# Patient Record
Sex: FEMALE | Race: WHITE | Employment: OTHER | URBAN - METROPOLITAN AREA
[De-identification: names, ages, dates, MRNs, and addresses within clinical notes are randomized per-mention and may not be internally consistent; named-entity substitution may affect disease eponyms.]

---

## 2018-02-05 ENCOUNTER — HOSPITAL ENCOUNTER (EMERGENCY)
Age: 83
Discharge: HOME | End: 2018-02-05
Attending: EMERGENCY MEDICINE
Payer: MEDICARE

## 2018-02-05 ENCOUNTER — APPOINTMENT (OUTPATIENT)
Dept: GENERAL RADIOLOGY | Age: 83
End: 2018-02-05
Attending: EMERGENCY MEDICINE
Payer: MEDICARE

## 2018-02-05 ENCOUNTER — APPOINTMENT (OUTPATIENT)
Dept: CT IMAGING | Age: 83
End: 2018-02-05
Attending: EMERGENCY MEDICINE
Payer: MEDICARE

## 2018-02-05 VITALS
TEMPERATURE: 97.8 F | HEART RATE: 90 BPM | SYSTOLIC BLOOD PRESSURE: 150 MMHG | DIASTOLIC BLOOD PRESSURE: 67 MMHG | HEIGHT: 62 IN | OXYGEN SATURATION: 96 % | WEIGHT: 180 LBS | RESPIRATION RATE: 14 BRPM | BODY MASS INDEX: 33.13 KG/M2
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
## 2018-02-05 DIAGNOSIS — M25.511 PAIN IN JOINT OF RIGHT SHOULDER: ICD-10-CM

## 2018-02-05 DIAGNOSIS — S09.90XA CLOSED HEAD INJURY, INITIAL ENCOUNTER: ICD-10-CM

## 2018-02-05 PROCEDURE — 72125 CT NECK SPINE W/O DYE: CPT

## 2018-02-05 PROCEDURE — 70450 CT HEAD/BRAIN W/O DYE: CPT

## 2018-02-05 PROCEDURE — 74011250637 HC RX REV CODE- 250/637: Performed by: NURSE PRACTITIONER

## 2018-02-05 PROCEDURE — 73562 X-RAY EXAM OF KNEE 3: CPT

## 2018-02-05 PROCEDURE — 73030 X-RAY EXAM OF SHOULDER: CPT

## 2018-02-05 PROCEDURE — 99283 EMERGENCY DEPT VISIT LOW MDM: CPT | Performed by: NURSE PRACTITIONER

## 2018-02-05 RX ORDER — ACETAMINOPHEN 325 MG/1
650 TABLET ORAL
Status: COMPLETED | OUTPATIENT
Start: 2018-02-05 | End: 2018-02-05

## 2018-02-05 RX ADMIN — ACETAMINOPHEN 650 MG: 325 TABLET ORAL at 17:24

## 2018-02-05 NOTE — ED TRIAGE NOTES
Patient states for awhile now very unsteady especially when she first gets up. Today getting out of a chair and fell to the right side striking the right side of her head, right side of neck, and right knee.

## 2018-02-05 NOTE — ED NOTES
I have reviewed discharge instructions with the patient. The patient verbalized understanding. Patient left ED via Discharge Method: ambulatory to Home with daughter. Opportunity for questions and clarification provided. Patient given 0 scripts. To continue your aftercare when you leave the hospital, you may receive an automated call from our care team to check in on how you are doing. This is a free service and part of our promise to provide the best care and service to meet your aftercare needs.  If you have questions, or wish to unsubscribe from this service please call [phone]. Thank you for Choosing our [facility] Emergency Department.

## 2018-02-05 NOTE — DISCHARGE INSTRUCTIONS

## 2018-02-05 NOTE — ED PROVIDER NOTES
HPI Comments: Patient presents with headache, right shoulder pain, and right knee pain that started today after a fall. She states she was getting out of her chair when she fell. She denies LOC. She states she has been having problems with dizziness with standing. She states she has seen her pcp for problem. She is alert and oriented. She is answering questions appropriately. Patient is a 80 y.o. female presenting with fall. The history is provided by the patient. Fall   The accident occurred 3 to 5 hours ago. The fall occurred while standing. She fell from a height of ground level. She landed on hard floor. There was no blood loss. The point of impact was the head, neck and left shoulder. The pain is present in the right knee, head, neck and right shoulder. The pain is at a severity of 7/10. The pain is mild. She was ambulatory at the scene. There was no entrapment after the fall. There was no drug use involved in the accident. There was no alcohol use involved in the accident. Pertinent negatives include no visual change, no fever, no numbness, no abdominal pain, no bowel incontinence, no nausea, no vomiting, no hematuria, no headaches, no extremity weakness, no hearing loss, no loss of consciousness, no tingling and no laceration. The risk factors include being elderly and recurrent falls. She has tried nothing for the symptoms. Past Medical History:   Diagnosis Date    Diabetes ([address]     Hypertension        Past Surgical History:   Procedure Laterality Date    HX CHOLECYSTECTOMY           History reviewed. No pertinent family history. Social History     Social History    Marital status:      Spouse name: N/A    Number of children: N/A    Years of education: N/A     Occupational History    Not on file.      Social History Main Topics    Smoking status: Never Smoker    Smokeless tobacco: Not on file    Alcohol use No    Drug use: No    Sexual activity: No     Other Topics Concern  Not on file     Social History Narrative         ALLERGIES: Review of patient's allergies indicates no known allergies. Review of Systems   Constitutional: Negative for fever. Respiratory: Negative for cough and shortness of breath. Cardiovascular: Negative for chest pain. Gastrointestinal: Negative for abdominal pain, bowel incontinence, nausea and vomiting. Genitourinary: Negative for hematuria. Musculoskeletal: Positive for arthralgias and neck pain. Negative for extremity weakness and joint swelling. Skin: Positive for wound. Neurological: Negative for tingling, loss of consciousness, numbness and headaches. Vitals:    02/05/18 1441   BP: 150/67   Pulse: 90   Resp: 14   Temp: 97.8 °F (36.6 °C)   SpO2: 96%   Weight: 81.6 kg (180 lb)   Height: 5' 2\" (1.575 m)            Physical Exam   Constitutional: She is oriented to person, place, and time. She appears well-developed and well-nourished. No distress. Cardiovascular: Normal rate and regular rhythm. No murmur heard. Pulmonary/Chest: Effort normal and breath sounds normal.   Abdominal: Soft. Bowel sounds are normal.   Musculoskeletal: Normal range of motion. Right shoulder: She exhibits tenderness and pain. She exhibits normal pulse and normal strength. Right knee: She exhibits bony tenderness. Cervical back: She exhibits no tenderness and no pain. Neurological: She is alert and oriented to person, place, and time. She has normal strength. No cranial nerve deficit or sensory deficit. GCS eye subscore is 4. GCS verbal subscore is 5. GCS motor subscore is 6. Skin: Skin is warm and dry. No laceration noted. She is not diaphoretic. Psychiatric: She has a normal mood and affect. Her behavior is normal.   Nursing note and vitals reviewed.      Xr Shoulder Rt Ap/lat Min 2 V    Result Date: 2/5/2018  THREE-VIEW RIGHT PATELLA, THREE-VIEW RIGHT SHOULDER: CLINICAL HISTORY:  Moderate right shoulder and knee pain after fall. COMPARISON:  None. FINDINGS:  No definite fracture, malalignment, or poli bone destruction is evident. No persistent radiopaque foreign body is seen. There are mild degenerative changes at the knee and acromioclavicular joint. IMPRESSION:  DEGENERATIVE CHANGES BUT NO ACUTE BONY ABNORMALITY IDENTIFIED. Xr Patella Rt 3 V    Result Date: 2/5/2018  THREE-VIEW RIGHT PATELLA, THREE-VIEW RIGHT SHOULDER: CLINICAL HISTORY:  Moderate right shoulder and knee pain after fall. COMPARISON:  None. FINDINGS:  No definite fracture, malalignment, or poli bone destruction is evident. No persistent radiopaque foreign body is seen. There are mild degenerative changes at the knee and acromioclavicular joint. IMPRESSION:  DEGENERATIVE CHANGES BUT NO ACUTE BONY ABNORMALITY IDENTIFIED. Ct Head Wo Cont    Result Date: 2/5/2018  NONCONTRAST HEAD CT CLINICAL HISTORY:  Posterior head injury from fall today. COMPARISON:  None. REPORT:   Standard non contrast head CT demonstrates no definite intracranial mass effect, hemorrhage, or evidence of acute geographic infarction. The ventricles are normal in size and configuration, accounting for the patient's age. Orbits and  paranasal sinuses are clear where imaged. Bone windows demonstrate no definite fracture or destruction. IMPRESSION:     NO ACUTE INTRACRANIAL ABNORMALITY OR CALVARIAL FRACTURE IDENTIFIED. Ct Spine Cerv Wo Cont    Result Date: 2/5/2018  CT CERVICAL SPINE WITH ADDITIONAL REFORMATS CLINICAL HISTORY:  Right neck pain since fall today. TECHNIQUE:  Axial images were obtained with spiral technique, and coronal and sagittal reformats were produced. COMPARISON:  None. FINDINGS:  No definite acute fracture or malalignment is evident. Prevertebral soft tissues are unremarkable. There is degenerative disc disease at C5-6. There is only mild uncovertebral and facet spurring without significant foraminal narrowing.      IMPRESSION:  No acute abnormality. MDM  Number of Diagnoses or Management Options  Closed head injury, initial encounter:   Fall, initial encounter:   Pain in joint of right shoulder:   Diagnosis management comments: No acute abnormality noted on CT of head and neck.  Xray of shoulder and patella negative for acute fracture patient given po tylenol and referred to pcp       Amount and/or Complexity of Data Reviewed  Tests in the radiology section of CPT®: ordered and reviewed  Tests in the medicine section of CPT®: ordered    Patient Progress  Patient progress: stable        ED Course       Procedures

## 2024-12-21 ENCOUNTER — APPOINTMENT (OUTPATIENT)
Dept: GENERAL RADIOLOGY | Age: 88
End: 2024-12-21
Payer: MEDICARE

## 2024-12-21 ENCOUNTER — APPOINTMENT (OUTPATIENT)
Dept: CT IMAGING | Age: 88
End: 2024-12-21
Payer: MEDICARE

## 2024-12-21 ENCOUNTER — HOSPITAL ENCOUNTER (OUTPATIENT)
Age: 88
Setting detail: OBSERVATION
Discharge: HOME | End: 2024-12-23
Attending: EMERGENCY MEDICINE | Admitting: HOSPITALIST
Payer: MEDICARE

## 2024-12-21 DIAGNOSIS — R41.82 ALTERED MENTAL STATUS, UNSPECIFIED ALTERED MENTAL STATUS TYPE: Primary | ICD-10-CM

## 2024-12-21 DIAGNOSIS — T68.XXXA HYPOTHERMIA, INITIAL ENCOUNTER: ICD-10-CM

## 2024-12-21 PROBLEM — E16.2 HYPOGLYCEMIA: Status: ACTIVE | Noted: 2024-12-21

## 2024-12-21 PROBLEM — E03.9 HYPOTHYROIDISM: Status: ACTIVE | Noted: 2024-12-21

## 2024-12-21 PROBLEM — F32.A DEPRESSION: Status: ACTIVE | Noted: 2024-12-21

## 2024-12-21 PROBLEM — G62.9 PERIPHERAL NEUROPATHY: Status: ACTIVE | Noted: 2024-12-21

## 2024-12-21 LAB
ALBUMIN SERPL-MCNC: 3.3 G/DL (ref 3.2–4.6)
ALBUMIN/GLOB SERPL: 1 (ref 1–1.9)
ALP SERPL-CCNC: 69 U/L (ref 35–104)
ALT SERPL-CCNC: 14 U/L (ref 8–45)
ANION GAP SERPL CALC-SCNC: 13 MMOL/L (ref 7–16)
APPEARANCE UR: CLEAR
AST SERPL-CCNC: 38 U/L (ref 15–37)
BACTERIA URNS QL MICRO: 0 /HPF
BASOPHILS # BLD: 0.1 K/UL (ref 0–0.2)
BASOPHILS NFR BLD: 1 % (ref 0–2)
BILIRUB SERPL-MCNC: 0.2 MG/DL (ref 0–1.2)
BILIRUB UR QL: NEGATIVE
BUN SERPL-MCNC: 9 MG/DL (ref 8–23)
CALCIUM SERPL-MCNC: 9.3 MG/DL (ref 8.8–10.2)
CHLORIDE SERPL-SCNC: 95 MMOL/L (ref 98–107)
CO2 SERPL-SCNC: 26 MMOL/L (ref 20–29)
COLOR UR: ABNORMAL
CREAT SERPL-MCNC: 0.75 MG/DL (ref 0.6–1.1)
DIFFERENTIAL METHOD BLD: ABNORMAL
EOSINOPHIL # BLD: 0.3 K/UL (ref 0–0.8)
EOSINOPHIL NFR BLD: 2 % (ref 0.5–7.8)
EPI CELLS #/AREA URNS HPF: ABNORMAL /HPF
ERYTHROCYTE [DISTWIDTH] IN BLOOD BY AUTOMATED COUNT: 13.7 % (ref 11.9–14.6)
GLOBULIN SER CALC-MCNC: 3.4 G/DL (ref 2.3–3.5)
GLUCOSE BLD STRIP.AUTO-MCNC: 175 MG/DL (ref 65–100)
GLUCOSE BLD STRIP.AUTO-MCNC: 183 MG/DL (ref 65–100)
GLUCOSE BLD STRIP.AUTO-MCNC: 193 MG/DL (ref 65–100)
GLUCOSE BLD STRIP.AUTO-MCNC: 86 MG/DL (ref 65–100)
GLUCOSE SERPL-MCNC: 191 MG/DL (ref 70–99)
GLUCOSE UR STRIP.AUTO-MCNC: 100 MG/DL
HCT VFR BLD AUTO: 40.6 % (ref 35.8–46.3)
HGB BLD-MCNC: 13.6 G/DL (ref 11.7–15.4)
HGB UR QL STRIP: ABNORMAL
IMM GRANULOCYTES # BLD AUTO: 0.2 K/UL (ref 0–0.5)
IMM GRANULOCYTES NFR BLD AUTO: 2 % (ref 0–5)
KETONES UR QL STRIP.AUTO: NEGATIVE MG/DL
LACTATE SERPL-SCNC: 1.5 MMOL/L (ref 0.5–2)
LEUKOCYTE ESTERASE UR QL STRIP.AUTO: NEGATIVE
LYMPHOCYTES # BLD: 1.4 K/UL (ref 0.5–4.6)
LYMPHOCYTES NFR BLD: 10 % (ref 13–44)
MCH RBC QN AUTO: 31.4 PG (ref 26.1–32.9)
MCHC RBC AUTO-ENTMCNC: 33.5 G/DL (ref 31.4–35)
MCV RBC AUTO: 93.8 FL (ref 82–102)
MONOCYTES # BLD: 1.3 K/UL (ref 0.1–1.3)
MONOCYTES NFR BLD: 9 % (ref 4–12)
NEUTS SEG # BLD: 10.3 K/UL (ref 1.7–8.2)
NEUTS SEG NFR BLD: 76 % (ref 43–78)
NITRITE UR QL STRIP.AUTO: NEGATIVE
NRBC # BLD: 0 K/UL (ref 0–0.2)
PH UR STRIP: 7 (ref 5–9)
PLATELET # BLD AUTO: 232 K/UL (ref 150–450)
PMV BLD AUTO: 10.4 FL (ref 9.4–12.3)
POTASSIUM SERPL-SCNC: 4.5 MMOL/L (ref 3.5–5.1)
PROCALCITONIN SERPL-MCNC: <0.02 NG/ML (ref 0–0.1)
PROT SERPL-MCNC: 6.7 G/DL (ref 6.3–8.2)
PROT UR STRIP-MCNC: >300 MG/DL
RBC # BLD AUTO: 4.33 M/UL (ref 4.05–5.2)
RBC #/AREA URNS HPF: ABNORMAL /HPF
SERVICE CMNT-IMP: ABNORMAL
SERVICE CMNT-IMP: NORMAL
SODIUM SERPL-SCNC: 134 MMOL/L (ref 136–145)
SP GR UR REFRACTOMETRY: 1.02 (ref 1–1.02)
TROPONIN T SERPL HS-MCNC: 12 NG/L (ref 0–14)
TSH, 3RD GENERATION: 1.87 UIU/ML (ref 0.27–4.2)
UROBILINOGEN UR QL STRIP.AUTO: 0.2 EU/DL (ref 0.2–1)
WBC # BLD AUTO: 13.5 K/UL (ref 4.3–11.1)
WBC URNS QL MICRO: ABNORMAL /HPF

## 2024-12-21 PROCEDURE — 2580000003 HC RX 258: Performed by: EMERGENCY MEDICINE

## 2024-12-21 PROCEDURE — 85025 COMPLETE CBC W/AUTO DIFF WBC: CPT

## 2024-12-21 PROCEDURE — 80053 COMPREHEN METABOLIC PANEL: CPT

## 2024-12-21 PROCEDURE — 84484 ASSAY OF TROPONIN QUANT: CPT

## 2024-12-21 PROCEDURE — 6370000000 HC RX 637 (ALT 250 FOR IP): Performed by: HOSPITALIST

## 2024-12-21 PROCEDURE — 82962 GLUCOSE BLOOD TEST: CPT

## 2024-12-21 PROCEDURE — 99285 EMERGENCY DEPT VISIT HI MDM: CPT

## 2024-12-21 PROCEDURE — 93005 ELECTROCARDIOGRAM TRACING: CPT | Performed by: EMERGENCY MEDICINE

## 2024-12-21 PROCEDURE — 84145 PROCALCITONIN (PCT): CPT

## 2024-12-21 PROCEDURE — 94760 N-INVAS EAR/PLS OXIMETRY 1: CPT

## 2024-12-21 PROCEDURE — 2500000003 HC RX 250 WO HCPCS: Performed by: HOSPITALIST

## 2024-12-21 PROCEDURE — 83605 ASSAY OF LACTIC ACID: CPT

## 2024-12-21 PROCEDURE — 96361 HYDRATE IV INFUSION ADD-ON: CPT

## 2024-12-21 PROCEDURE — 84443 ASSAY THYROID STIM HORMONE: CPT

## 2024-12-21 PROCEDURE — 6360000002 HC RX W HCPCS: Performed by: EMERGENCY MEDICINE

## 2024-12-21 PROCEDURE — 96368 THER/DIAG CONCURRENT INF: CPT

## 2024-12-21 PROCEDURE — 70450 CT HEAD/BRAIN W/O DYE: CPT

## 2024-12-21 PROCEDURE — G0378 HOSPITAL OBSERVATION PER HR: HCPCS

## 2024-12-21 PROCEDURE — 96366 THER/PROPH/DIAG IV INF ADDON: CPT

## 2024-12-21 PROCEDURE — 71045 X-RAY EXAM CHEST 1 VIEW: CPT

## 2024-12-21 PROCEDURE — 96365 THER/PROPH/DIAG IV INF INIT: CPT

## 2024-12-21 PROCEDURE — 2580000003 HC RX 258: Performed by: HOSPITALIST

## 2024-12-21 PROCEDURE — 81001 URINALYSIS AUTO W/SCOPE: CPT

## 2024-12-21 RX ORDER — SODIUM CHLORIDE 9 MG/ML
INJECTION, SOLUTION INTRAVENOUS PRN
Status: DISCONTINUED | OUTPATIENT
Start: 2024-12-21 | End: 2024-12-23 | Stop reason: HOSPADM

## 2024-12-21 RX ORDER — GABAPENTIN 300 MG/1
300 CAPSULE ORAL 2 TIMES DAILY
COMMUNITY

## 2024-12-21 RX ORDER — ASPIRIN 81 MG/1
81 TABLET ORAL DAILY
Status: DISCONTINUED | OUTPATIENT
Start: 2024-12-22 | End: 2024-12-23 | Stop reason: HOSPADM

## 2024-12-21 RX ORDER — LISINOPRIL 5 MG/1
5 TABLET ORAL DAILY
Status: ON HOLD | COMMUNITY
End: 2024-12-23

## 2024-12-21 RX ORDER — POLYETHYLENE GLYCOL 3350 17 G/17G
17 POWDER, FOR SOLUTION ORAL DAILY PRN
Status: DISCONTINUED | OUTPATIENT
Start: 2024-12-21 | End: 2024-12-23 | Stop reason: HOSPADM

## 2024-12-21 RX ORDER — METFORMIN HYDROCHLORIDE 500 MG/1
500 TABLET, EXTENDED RELEASE ORAL 2 TIMES DAILY
COMMUNITY

## 2024-12-21 RX ORDER — ACETAMINOPHEN 325 MG/1
650 TABLET ORAL EVERY 6 HOURS PRN
Status: DISCONTINUED | OUTPATIENT
Start: 2024-12-21 | End: 2024-12-23 | Stop reason: HOSPADM

## 2024-12-21 RX ORDER — SODIUM CHLORIDE 0.9 % (FLUSH) 0.9 %
5-40 SYRINGE (ML) INJECTION PRN
Status: DISCONTINUED | OUTPATIENT
Start: 2024-12-21 | End: 2024-12-23 | Stop reason: HOSPADM

## 2024-12-21 RX ORDER — LATANOPROST 50 UG/ML
1 SOLUTION/ DROPS OPHTHALMIC NIGHTLY
Status: DISCONTINUED | OUTPATIENT
Start: 2024-12-21 | End: 2024-12-23 | Stop reason: HOSPADM

## 2024-12-21 RX ORDER — POTASSIUM CHLORIDE 7.45 MG/ML
10 INJECTION INTRAVENOUS PRN
Status: DISCONTINUED | OUTPATIENT
Start: 2024-12-21 | End: 2024-12-23 | Stop reason: HOSPADM

## 2024-12-21 RX ORDER — TRIAMCINOLONE ACETONIDE 1 MG/G
0.1 CREAM TOPICAL 2 TIMES DAILY
COMMUNITY

## 2024-12-21 RX ORDER — DEXTROSE MONOHYDRATE 25 G/50ML
25 INJECTION, SOLUTION INTRAVENOUS
Status: DISCONTINUED | OUTPATIENT
Start: 2024-12-21 | End: 2024-12-21

## 2024-12-21 RX ORDER — ESCITALOPRAM OXALATE 10 MG/1
10 TABLET ORAL DAILY
COMMUNITY

## 2024-12-21 RX ORDER — LEVOTHYROXINE SODIUM 112 UG/1
112 TABLET ORAL DAILY
COMMUNITY
Start: 2024-04-11

## 2024-12-21 RX ORDER — CYANOCOBALAMIN (VITAMIN B-12) 5000 MCG
5000 TABLET,DISINTEGRATING ORAL DAILY
COMMUNITY

## 2024-12-21 RX ORDER — ENOXAPARIN SODIUM 100 MG/ML
40 INJECTION SUBCUTANEOUS DAILY
Status: DISCONTINUED | OUTPATIENT
Start: 2024-12-21 | End: 2024-12-23 | Stop reason: HOSPADM

## 2024-12-21 RX ORDER — ACETAMINOPHEN 325 MG/1
325 TABLET ORAL EVERY 6 HOURS PRN
COMMUNITY

## 2024-12-21 RX ORDER — ONDANSETRON 4 MG/1
4 TABLET, ORALLY DISINTEGRATING ORAL EVERY 8 HOURS PRN
Status: DISCONTINUED | OUTPATIENT
Start: 2024-12-21 | End: 2024-12-23 | Stop reason: HOSPADM

## 2024-12-21 RX ORDER — TIMOLOL MALEATE 5 MG/ML
1 SOLUTION/ DROPS OPHTHALMIC 2 TIMES DAILY
COMMUNITY

## 2024-12-21 RX ORDER — POTASSIUM CHLORIDE 1500 MG/1
40 TABLET, EXTENDED RELEASE ORAL PRN
Status: DISCONTINUED | OUTPATIENT
Start: 2024-12-21 | End: 2024-12-23 | Stop reason: HOSPADM

## 2024-12-21 RX ORDER — INSULIN LISPRO 100 [IU]/ML
INJECTION, SOLUTION INTRAVENOUS; SUBCUTANEOUS
Status: ON HOLD | COMMUNITY
End: 2024-12-23 | Stop reason: HOSPADM

## 2024-12-21 RX ORDER — NAPROXEN SODIUM 220 MG/1
220 TABLET, FILM COATED ORAL DAILY PRN
COMMUNITY

## 2024-12-21 RX ORDER — LOPERAMIDE HYDROCHLORIDE 2 MG/1
2 CAPSULE ORAL DAILY PRN
COMMUNITY

## 2024-12-21 RX ORDER — LEVOTHYROXINE SODIUM 112 UG/1
112 TABLET ORAL DAILY
Status: DISCONTINUED | OUTPATIENT
Start: 2024-12-22 | End: 2024-12-23 | Stop reason: HOSPADM

## 2024-12-21 RX ORDER — SODIUM CHLORIDE 9 MG/ML
INJECTION, SOLUTION INTRAVENOUS CONTINUOUS
Status: DISCONTINUED | OUTPATIENT
Start: 2024-12-21 | End: 2024-12-22

## 2024-12-21 RX ORDER — ASPIRIN 81 MG/1
81 TABLET ORAL DAILY
COMMUNITY

## 2024-12-21 RX ORDER — BRINZOLAMIDE 10 MG/ML
1 SUSPENSION/ DROPS OPHTHALMIC 2 TIMES DAILY
COMMUNITY

## 2024-12-21 RX ORDER — 0.9 % SODIUM CHLORIDE 0.9 %
500 INTRAVENOUS SOLUTION INTRAVENOUS ONCE
Status: COMPLETED | OUTPATIENT
Start: 2024-12-21 | End: 2024-12-21

## 2024-12-21 RX ORDER — LATANOPROST 50 UG/ML
1 SOLUTION/ DROPS OPHTHALMIC NIGHTLY
COMMUNITY
Start: 2024-11-09

## 2024-12-21 RX ORDER — INSULIN GLARGINE 100 [IU]/ML
50 INJECTION, SOLUTION SUBCUTANEOUS DAILY
Status: ON HOLD | COMMUNITY
Start: 2024-10-31 | End: 2024-12-23

## 2024-12-21 RX ORDER — MAGNESIUM SULFATE IN WATER 40 MG/ML
2000 INJECTION, SOLUTION INTRAVENOUS PRN
Status: DISCONTINUED | OUTPATIENT
Start: 2024-12-21 | End: 2024-12-23 | Stop reason: HOSPADM

## 2024-12-21 RX ORDER — ONDANSETRON 2 MG/ML
4 INJECTION INTRAMUSCULAR; INTRAVENOUS EVERY 6 HOURS PRN
Status: DISCONTINUED | OUTPATIENT
Start: 2024-12-21 | End: 2024-12-23 | Stop reason: HOSPADM

## 2024-12-21 RX ORDER — ONDANSETRON 4 MG/1
4 TABLET, ORALLY DISINTEGRATING ORAL 3 TIMES DAILY PRN
COMMUNITY

## 2024-12-21 RX ORDER — ALLOPURINOL 100 MG/1
100 TABLET ORAL DAILY
COMMUNITY
Start: 2024-10-31

## 2024-12-21 RX ORDER — BRIMONIDINE TARTRATE AND TIMOLOL MALEATE 2; 5 MG/ML; MG/ML
1 SOLUTION OPHTHALMIC EVERY 12 HOURS
COMMUNITY

## 2024-12-21 RX ORDER — ACETAMINOPHEN 650 MG/1
650 SUPPOSITORY RECTAL EVERY 6 HOURS PRN
Status: DISCONTINUED | OUTPATIENT
Start: 2024-12-21 | End: 2024-12-23 | Stop reason: HOSPADM

## 2024-12-21 RX ORDER — SODIUM CHLORIDE 0.9 % (FLUSH) 0.9 %
5-40 SYRINGE (ML) INJECTION EVERY 12 HOURS SCHEDULED
Status: DISCONTINUED | OUTPATIENT
Start: 2024-12-21 | End: 2024-12-23 | Stop reason: HOSPADM

## 2024-12-21 RX ORDER — LISINOPRIL 5 MG/1
5 TABLET ORAL DAILY
Status: DISCONTINUED | OUTPATIENT
Start: 2024-12-22 | End: 2024-12-23

## 2024-12-21 RX ORDER — ALLOPURINOL 100 MG/1
100 TABLET ORAL DAILY
Status: DISCONTINUED | OUTPATIENT
Start: 2024-12-22 | End: 2024-12-23 | Stop reason: HOSPADM

## 2024-12-21 RX ORDER — INSULIN ASPART 100 [IU]/ML
12 INJECTION, SOLUTION INTRAVENOUS; SUBCUTANEOUS
COMMUNITY

## 2024-12-21 RX ADMIN — SODIUM CHLORIDE, PRESERVATIVE FREE 10 ML: 5 INJECTION INTRAVENOUS at 20:48

## 2024-12-21 RX ADMIN — SODIUM CHLORIDE: 9 INJECTION, SOLUTION INTRAVENOUS at 20:47

## 2024-12-21 RX ADMIN — CEFTRIAXONE SODIUM 2000 MG: 2 INJECTION, POWDER, FOR SOLUTION INTRAMUSCULAR; INTRAVENOUS at 17:52

## 2024-12-21 RX ADMIN — DEXTROSE MONOHYDRATE 250 ML: 100 INJECTION, SOLUTION INTRAVENOUS at 17:08

## 2024-12-21 RX ADMIN — LATANOPROST 1 DROP: 50 SOLUTION OPHTHALMIC at 22:09

## 2024-12-21 RX ADMIN — SODIUM CHLORIDE 500 ML: 9 INJECTION, SOLUTION INTRAVENOUS at 16:01

## 2024-12-21 ASSESSMENT — LIFESTYLE VARIABLES
HOW OFTEN DO YOU HAVE A DRINK CONTAINING ALCOHOL: NEVER
HOW MANY STANDARD DRINKS CONTAINING ALCOHOL DO YOU HAVE ON A TYPICAL DAY: PATIENT DOES NOT DRINK

## 2024-12-21 NOTE — ED PROVIDER NOTES
Electronically signed by Mariano Wang         NIH Stroke Scale  Interval: Baseline  Level of Consciousness (1a): Not alert, requires repeated stimulation to attend  LOC Questions (1b): Answers both correctly  LOC Commands (1c): Performs both tasks correctly  Best Gaze (2): Normal  Visual (3): No visual loss  Facial Palsy (4): (!) Minor paralysis  Motor Arm, Left (5a): Drift, but does not hit bed  Motor Arm, Right (5b): Drift, but does not hit bed  Motor Leg, Left (6a): Some effort against gravity  Motor Leg, Right (6b): Some effort against gravity  Sensory (8): Normal  Best Language (9): No aphasia  Dysarthria (10): Normal  Extinction and Inattention (11): No abnormality         No results for input(s): \"COVID19\" in the last 72 hours.     Voice dictation software was used during the making of this note.  This software is not perfect and grammatical and other typographical errors may be present.  This note has not been completely proofread for errors.     Davie Hinojosa MD  12/21/24 8542

## 2024-12-21 NOTE — ED TRIAGE NOTES
Pt from home via EMS for hypoglycemia, BS was 40 and pt was cold, clammy and unresponsive, last meal was breakfast this morning. 250 D10 Given en route and last BS was 200 in EMS

## 2024-12-21 NOTE — ED NOTES
Pt now alert and oriented, taking on phone eating crackers, daughter at bedside, pt / family updated

## 2024-12-22 PROBLEM — E11.649 TYPE 2 DIABETES MELLITUS WITH HYPOGLYCEMIA WITHOUT COMA (HCC): Status: ACTIVE | Noted: 2024-12-22

## 2024-12-22 PROBLEM — H40.10X0 OPEN-ANGLE GLAUCOMA: Status: ACTIVE | Noted: 2024-12-22

## 2024-12-22 PROBLEM — H35.30 MACULAR DEGENERATION: Status: ACTIVE | Noted: 2024-12-22

## 2024-12-22 LAB
ALBUMIN SERPL-MCNC: 3.1 G/DL (ref 3.2–4.6)
ALBUMIN/GLOB SERPL: 1 (ref 1–1.9)
ALP SERPL-CCNC: 61 U/L (ref 35–104)
ALT SERPL-CCNC: 13 U/L (ref 8–45)
ANION GAP SERPL CALC-SCNC: 11 MMOL/L (ref 7–16)
AST SERPL-CCNC: 29 U/L (ref 15–37)
BASOPHILS # BLD: 0.1 K/UL (ref 0–0.2)
BASOPHILS NFR BLD: 1 % (ref 0–2)
BILIRUB SERPL-MCNC: <0.2 MG/DL (ref 0–1.2)
BUN SERPL-MCNC: 8 MG/DL (ref 8–23)
CALCIUM SERPL-MCNC: 8.8 MG/DL (ref 8.8–10.2)
CHLORIDE SERPL-SCNC: 103 MMOL/L (ref 98–107)
CO2 SERPL-SCNC: 24 MMOL/L (ref 20–29)
CREAT SERPL-MCNC: 0.58 MG/DL (ref 0.6–1.1)
DIFFERENTIAL METHOD BLD: ABNORMAL
EKG ATRIAL RATE: 54 BPM
EKG DIAGNOSIS: NORMAL
EKG P AXIS: 75 DEGREES
EKG P-R INTERVAL: 263 MS
EKG Q-T INTERVAL: 467 MS
EKG QRS DURATION: 112 MS
EKG QTC CALCULATION (BAZETT): 435 MS
EKG R AXIS: -84 DEGREES
EKG T AXIS: 52 DEGREES
EKG VENTRICULAR RATE: 54 BPM
EOSINOPHIL # BLD: 0.2 K/UL (ref 0–0.8)
EOSINOPHIL NFR BLD: 2 % (ref 0.5–7.8)
ERYTHROCYTE [DISTWIDTH] IN BLOOD BY AUTOMATED COUNT: 13.6 % (ref 11.9–14.6)
GLOBULIN SER CALC-MCNC: 3 G/DL (ref 2.3–3.5)
GLUCOSE BLD STRIP.AUTO-MCNC: 125 MG/DL (ref 65–100)
GLUCOSE BLD STRIP.AUTO-MCNC: 240 MG/DL (ref 65–100)
GLUCOSE BLD STRIP.AUTO-MCNC: 290 MG/DL (ref 65–100)
GLUCOSE BLD STRIP.AUTO-MCNC: 292 MG/DL (ref 65–100)
GLUCOSE BLD STRIP.AUTO-MCNC: 294 MG/DL (ref 65–100)
GLUCOSE SERPL-MCNC: 136 MG/DL (ref 70–99)
HCT VFR BLD AUTO: 37.6 % (ref 35.8–46.3)
HGB BLD-MCNC: 12.2 G/DL (ref 11.7–15.4)
IMM GRANULOCYTES # BLD AUTO: 0.1 K/UL (ref 0–0.5)
IMM GRANULOCYTES NFR BLD AUTO: 1 % (ref 0–5)
LYMPHOCYTES # BLD: 1.7 K/UL (ref 0.5–4.6)
LYMPHOCYTES NFR BLD: 16 % (ref 13–44)
MCH RBC QN AUTO: 30.4 PG (ref 26.1–32.9)
MCHC RBC AUTO-ENTMCNC: 32.4 G/DL (ref 31.4–35)
MCV RBC AUTO: 93.8 FL (ref 82–102)
MONOCYTES # BLD: 1.2 K/UL (ref 0.1–1.3)
MONOCYTES NFR BLD: 11 % (ref 4–12)
NEUTS SEG # BLD: 7 K/UL (ref 1.7–8.2)
NEUTS SEG NFR BLD: 68 % (ref 43–78)
NRBC # BLD: 0 K/UL (ref 0–0.2)
PLATELET # BLD AUTO: 184 K/UL (ref 150–450)
PMV BLD AUTO: 10.3 FL (ref 9.4–12.3)
POTASSIUM SERPL-SCNC: 4.6 MMOL/L (ref 3.5–5.1)
PROT SERPL-MCNC: 6.1 G/DL (ref 6.3–8.2)
RBC # BLD AUTO: 4.01 M/UL (ref 4.05–5.2)
SERVICE CMNT-IMP: ABNORMAL
SODIUM SERPL-SCNC: 138 MMOL/L (ref 136–145)
WBC # BLD AUTO: 10.3 K/UL (ref 4.3–11.1)

## 2024-12-22 PROCEDURE — G0378 HOSPITAL OBSERVATION PER HR: HCPCS

## 2024-12-22 PROCEDURE — 97165 OT EVAL LOW COMPLEX 30 MIN: CPT

## 2024-12-22 PROCEDURE — 97530 THERAPEUTIC ACTIVITIES: CPT

## 2024-12-22 PROCEDURE — 6370000000 HC RX 637 (ALT 250 FOR IP): Performed by: PHYSICIAN ASSISTANT

## 2024-12-22 PROCEDURE — 96361 HYDRATE IV INFUSION ADD-ON: CPT

## 2024-12-22 PROCEDURE — 80053 COMPREHEN METABOLIC PANEL: CPT

## 2024-12-22 PROCEDURE — 97535 SELF CARE MNGMENT TRAINING: CPT

## 2024-12-22 PROCEDURE — 36415 COLL VENOUS BLD VENIPUNCTURE: CPT

## 2024-12-22 PROCEDURE — 82962 GLUCOSE BLOOD TEST: CPT

## 2024-12-22 PROCEDURE — 93010 ELECTROCARDIOGRAM REPORT: CPT | Performed by: INTERNAL MEDICINE

## 2024-12-22 PROCEDURE — 85025 COMPLETE CBC W/AUTO DIFF WBC: CPT

## 2024-12-22 PROCEDURE — 6370000000 HC RX 637 (ALT 250 FOR IP): Performed by: HOSPITALIST

## 2024-12-22 PROCEDURE — 97161 PT EVAL LOW COMPLEX 20 MIN: CPT

## 2024-12-22 RX ORDER — INSULIN LISPRO 100 [IU]/ML
0-8 INJECTION, SOLUTION INTRAVENOUS; SUBCUTANEOUS
Status: DISCONTINUED | OUTPATIENT
Start: 2024-12-22 | End: 2024-12-23 | Stop reason: HOSPADM

## 2024-12-22 RX ORDER — INSULIN GLARGINE 100 [IU]/ML
48 INJECTION, SOLUTION SUBCUTANEOUS NIGHTLY
Status: DISCONTINUED | OUTPATIENT
Start: 2024-12-22 | End: 2024-12-23 | Stop reason: HOSPADM

## 2024-12-22 RX ORDER — INSULIN LISPRO 100 [IU]/ML
18 INJECTION, SOLUTION INTRAVENOUS; SUBCUTANEOUS
Status: DISCONTINUED | OUTPATIENT
Start: 2024-12-22 | End: 2024-12-23

## 2024-12-22 RX ADMIN — INSULIN LISPRO 4 UNITS: 100 INJECTION, SOLUTION INTRAVENOUS; SUBCUTANEOUS at 16:58

## 2024-12-22 RX ADMIN — LATANOPROST 1 DROP: 50 SOLUTION OPHTHALMIC at 20:04

## 2024-12-22 RX ADMIN — LEVOTHYROXINE SODIUM 112 MCG: 0.11 TABLET ORAL at 05:34

## 2024-12-22 RX ADMIN — INSULIN LISPRO 18 UNITS: 100 INJECTION, SOLUTION INTRAVENOUS; SUBCUTANEOUS at 17:00

## 2024-12-22 RX ADMIN — INSULIN LISPRO 2 UNITS: 100 INJECTION, SOLUTION INTRAVENOUS; SUBCUTANEOUS at 12:09

## 2024-12-22 RX ADMIN — INSULIN LISPRO 4 UNITS: 100 INJECTION, SOLUTION INTRAVENOUS; SUBCUTANEOUS at 20:04

## 2024-12-22 RX ADMIN — INSULIN GLARGINE 48 UNITS: 100 INJECTION, SOLUTION SUBCUTANEOUS at 20:04

## 2024-12-22 RX ADMIN — ALLOPURINOL 100 MG: 100 TABLET ORAL at 08:55

## 2024-12-22 NOTE — ACP (ADVANCE CARE PLANNING)
Advance Care Planning   General Advance Care Planning (ACP) Conversation    Date of Conversation: 12/21/2024  Conducted with: Patient with Decision Making Capacity  Other persons present: None    Healthcare Decision Maker:    Primary Decision Maker: Ranjana Fox - Lidia - [phone]    Primary Decision Maker: Karlie Montero - [phone]          Annie Vásquez

## 2024-12-22 NOTE — CARE COORDINATION
CM note:    Chart reviewed for updates. CM met with pt at bedside, introduced role, confirmed demographics and discussed d/c planning. Pt lives with her daughter in a 1 level home with steps to get into the home. Pt is independent with ADL's at home and uses a RW or rollator to ambulate. She is insured and has a PCP that she sees as needed. Pt is no longer an active  in the community. She reports that she has been to inpatient rehab in the past and has used home health services. Therapy reports no needs for patient. Plan is for pt to discharge home with family support pending medical stability. Pt is on OBS. MOON given. CM will continue to follow up with d/c planning.     12/22/24 9606   Service Assessment   Patient Orientation Alert and Oriented   Cognition Alert   History Provided By Patient   Primary Caregiver Self   Accompanied By/Relationship N/A   Support Systems Family Members;Children   Patient's Healthcare Decision Maker is: Legal Next of Kin  (Daughter)   PCP Verified by CM Yes   Last Visit to PCP Within last 6 months   Prior Functional Level Independent in ADLs/IADLs   Current Functional Level Independent in ADLs/IADLs   Can patient return to prior living arrangement Yes   Ability to make needs known: Good   Family able to assist with home care needs: Yes   Would you like for me to discuss the discharge plan with any other family members/significant others, and if so, who? Yes  (Daughter)   Financial Resources Medicare   Community Resources None   Social/Functional History   Lives With Family   Type of Home House   Bathroom Shower/Tub Shower chair with back   Home Equipment Walker - Rolling;Rollator   Prior Level of Assist for ADLs Independent   Ambulation Assistance Independent   Active  No   Occupation Retired   Discharge Planning   Type of Residence House   Living Arrangements Children   Current Services Prior To Admission Durable Medical Equipment   Current DME Prior to Arrival Walker

## 2024-12-22 NOTE — ED NOTES
TRANSFER - OUT REPORT:    Verbal report given to Kristy on Melissa Molina  being transferred to [facility] for routine progression of patient care       Report consisted of patient's Situation, Background, Assessment and   Recommendations(SBAR).     Information from the following report(s) Nurse Handoff Report was reviewed with the receiving nurse.    Lines:   Peripheral IV 12/21/24 Left Antecubital (Active)        Opportunity for questions and clarification was provided.      Patient transported with:  Registered Nurse

## 2024-12-22 NOTE — H&P
5.1 mmol/L    Chloride 95 (L) 98 - 107 mmol/L    CO2 26 20 - 29 mmol/L    Anion Gap 13 7 - 16 mmol/L    Glucose 191 (H) 70 - 99 mg/dL    BUN 9 8 - 23 MG/DL    Creatinine 0.75 0.60 - 1.10 MG/DL    Est, Glom Filt Rate 75 >60 ml/min/1.73m2    Calcium 9.3 8.8 - 10.2 MG/DL    Total Bilirubin 0.2 0.0 - 1.2 MG/DL    ALT 14 8 - 45 U/L    AST 38 (H) 15 - 37 U/L    Alk Phosphatase 69 35 - 104 U/L    Total Protein 6.7 6.3 - 8.2 g/dL    Albumin 3.3 3.2 - 4.6 g/dL    Globulin 3.4 2.3 - 3.5 g/dL    Albumin/Globulin Ratio 1.0 1.0 - 1.9     TSH    Collection Time: 12/21/24  3:09 PM   Result Value Ref Range    TSH, 3rd Generation 1.870 0.270 - 4.200 uIU/mL   Urinalysis    Collection Time: 12/21/24  3:09 PM   Result Value Ref Range    Color, UA YELLOW/STRAW      Appearance CLEAR      Specific Gravity, UA 1.018 1.001 - 1.023      pH, Urine 7.0 5.0 - 9.0      Protein, UA >300 (A) NEG mg/dL    Glucose, Ur 100 (A) NEG mg/dL    Ketones, Urine Negative NEG mg/dL    Bilirubin, Urine Negative NEG      Blood, Urine TRACE (A) NEG      Urobilinogen, Urine 0.2 0.2 - 1.0 EU/dL    Nitrite, Urine Negative NEG      Leukocyte Esterase, Urine Negative NEG      WBC, UA 5-10 0 /hpf    RBC, UA 0-3 0 /hpf    Epithelial Cells, UA 3-5 0 /hpf    BACTERIA, URINE 0 0 /hpf   Troponin    Collection Time: 12/21/24  3:09 PM   Result Value Ref Range    Troponin T 12.0 0 - 14 ng/L   Lactate, Sepsis    Collection Time: 12/21/24  3:09 PM   Result Value Ref Range    Lactic Acid, Sepsis 1.5 0.5 - 2.0 MMOL/L   POCT Glucose    Collection Time: 12/21/24  4:56 PM   Result Value Ref Range    POC Glucose 86 65 - 100 mg/dL    Performed by: Korey    POCT Glucose    Collection Time: 12/21/24  6:01 PM   Result Value Ref Range    POC Glucose 175 (H) 65 - 100 mg/dL    Performed by: Dinah    POCT Glucose    Collection Time: 12/21/24  8:34 PM   Result Value Ref Range    POC Glucose 183 (H) 65 - 100 mg/dL    Performed by: Amilcar        No results for

## 2024-12-23 VITALS
HEIGHT: 62 IN | RESPIRATION RATE: 19 BRPM | OXYGEN SATURATION: 95 % | WEIGHT: 197 LBS | BODY MASS INDEX: 36.25 KG/M2 | TEMPERATURE: 98.2 F | SYSTOLIC BLOOD PRESSURE: 151 MMHG | DIASTOLIC BLOOD PRESSURE: 52 MMHG | HEART RATE: 87 BPM

## 2024-12-23 PROBLEM — E16.2 HYPOGLYCEMIA: Status: RESOLVED | Noted: 2024-12-21 | Resolved: 2024-12-23

## 2024-12-23 LAB
GLUCOSE BLD STRIP.AUTO-MCNC: 154 MG/DL (ref 65–100)
GLUCOSE BLD STRIP.AUTO-MCNC: 301 MG/DL (ref 65–100)
SERVICE CMNT-IMP: ABNORMAL
SERVICE CMNT-IMP: ABNORMAL

## 2024-12-23 PROCEDURE — G0378 HOSPITAL OBSERVATION PER HR: HCPCS

## 2024-12-23 PROCEDURE — 6370000000 HC RX 637 (ALT 250 FOR IP): Performed by: PHYSICIAN ASSISTANT

## 2024-12-23 PROCEDURE — 6370000000 HC RX 637 (ALT 250 FOR IP): Performed by: HOSPITALIST

## 2024-12-23 PROCEDURE — 2500000003 HC RX 250 WO HCPCS: Performed by: HOSPITALIST

## 2024-12-23 PROCEDURE — 6360000002 HC RX W HCPCS: Performed by: HOSPITALIST

## 2024-12-23 PROCEDURE — 82962 GLUCOSE BLOOD TEST: CPT

## 2024-12-23 PROCEDURE — 96372 THER/PROPH/DIAG INJ SC/IM: CPT

## 2024-12-23 RX ORDER — LISINOPRIL 5 MG/1
10 TABLET ORAL DAILY
Status: DISCONTINUED | OUTPATIENT
Start: 2024-12-23 | End: 2024-12-23 | Stop reason: HOSPADM

## 2024-12-23 RX ORDER — IBUPROFEN 600 MG/1
1 TABLET ORAL PRN
Status: DISCONTINUED | OUTPATIENT
Start: 2024-12-23 | End: 2024-12-23 | Stop reason: HOSPADM

## 2024-12-23 RX ORDER — GABAPENTIN 300 MG/1
300 CAPSULE ORAL ONCE
Status: COMPLETED | OUTPATIENT
Start: 2024-12-23 | End: 2024-12-23

## 2024-12-23 RX ORDER — LISINOPRIL 5 MG/1
5 TABLET ORAL DAILY
Qty: 30 TABLET | Refills: 3 | Status: SHIPPED | OUTPATIENT
Start: 2024-12-23 | End: 2024-12-23

## 2024-12-23 RX ORDER — LISINOPRIL 5 MG/1
5 TABLET ORAL DAILY
Qty: 30 TABLET | Refills: 3 | Status: SHIPPED | OUTPATIENT
Start: 2024-12-23

## 2024-12-23 RX ORDER — DEXTROSE MONOHYDRATE 100 MG/ML
INJECTION, SOLUTION INTRAVENOUS CONTINUOUS PRN
Status: DISCONTINUED | OUTPATIENT
Start: 2024-12-23 | End: 2024-12-23 | Stop reason: HOSPADM

## 2024-12-23 RX ORDER — INSULIN GLARGINE 100 [IU]/ML
48 INJECTION, SOLUTION SUBCUTANEOUS DAILY
Qty: 5 ADJUSTABLE DOSE PRE-FILLED PEN SYRINGE | Refills: 3 | Status: SHIPPED | OUTPATIENT
Start: 2024-12-23

## 2024-12-23 RX ADMIN — SODIUM CHLORIDE, PRESERVATIVE FREE 10 ML: 5 INJECTION INTRAVENOUS at 09:07

## 2024-12-23 RX ADMIN — ASPIRIN 81 MG: 81 TABLET, COATED ORAL at 09:06

## 2024-12-23 RX ADMIN — GABAPENTIN 300 MG: 300 CAPSULE ORAL at 12:09

## 2024-12-23 RX ADMIN — ENOXAPARIN SODIUM 40 MG: 100 INJECTION SUBCUTANEOUS at 09:06

## 2024-12-23 RX ADMIN — INSULIN LISPRO 6 UNITS: 100 INJECTION, SOLUTION INTRAVENOUS; SUBCUTANEOUS at 12:09

## 2024-12-23 RX ADMIN — ALLOPURINOL 100 MG: 100 TABLET ORAL at 09:06

## 2024-12-23 RX ADMIN — LISINOPRIL 10 MG: 5 TABLET ORAL at 09:06

## 2024-12-23 RX ADMIN — LEVOTHYROXINE SODIUM 112 MCG: 0.11 TABLET ORAL at 05:19

## 2024-12-23 NOTE — CARE COORDINATION
Patient with discharge orders for today. No additional needs made known to CM. Patient has met all treatment goals and milestones for discharge. Family to provide transportation home. CM following until patient is discharged.        12/23/24 1106   Services At/After Discharge   Transition of Care Consult (CM Consult) N/A   Services At/After Discharge None    Resource Information Provided? No   Mode of Transport at Discharge Other (see comment)  (Family)   Confirm Follow Up Transport Family   Condition of Participation: Discharge Planning   The Plan for Transition of Care is related to the following treatment goals: Patient to DC home and return to baseline level of function.   The Patient and/or Patient Representative was provided with a Choice of Provider? Patient   The Patient and/Or Patient Representative agree with the Discharge Plan? Yes   Freedom of Choice list was provided with basic dialogue that supports the patient's individualized plan of care/goals, treatment preferences, and shares the quality data associated with the providers?  Yes

## 2024-12-23 NOTE — PROGRESS NOTES
Hospitalist Discharge Summary   Admit Date:  2024  2:44 PM   DC Note date: 2024  Name:  Melissa Molina   Age:  89 y.o.  Sex:  female  :  1933   MRN:  [id number]   Room:  [facility]  PCP:  Skylar Polo MD    Presenting Complaint: Hypoglycemia and Altered Mental Status     Initial Admission Diagnosis: Hypoglycemia [E16.2]  Hypothermia, initial encounter [T68.XXXA]  Altered mental status, unspecified altered mental status type [R41.82]     Problem List for this Hospitalization (present on admission):    Principal Problem (Resolved):    Hypoglycemia  Active Problems:    Hypothyroidism    Depression    Peripheral neuropathy    Type 2 diabetes mellitus with hypoglycemia without coma (HCC)    Open-angle glaucoma    Macular degeneration      Hospital Course:  Melissa Molina is a 89 y.o. female with past medical history of diabetes type 2 on Premeal insulin 3 times a day and long-acting units around 50 units in the evening, history of for depression and peripheral neuropathy, hypothyroidism brought to emergency room with severe hypoglycemia, hypothermia. Patient currently lives with daughter, patient's daughter noted altered mental status around 2 PM, minimally responsive. Blood sugars was low, EMS was called in, patient was given D10 bolus to 50 upon arrival to emergency room blood sugar was greater than 100.     The patient's blood glucose has recovered.  She has been restarted on her home insulin.  This morning her blood glucose was 153 so her 18 units of short acting were held.  She will be discharged on her home Lantus but not on her short acting medications.  She is agreeable to follow-up with her PCP after discharge for further modification.  I reiterated with her that with her on insulin it is very important that she eats a regular 3 meals a day.  She understands and feels safe going home with her daughter.    Her blood pressure also has been significantly elevated since admission.  She 
ACUTE OCCUPATIONAL THERAPY GOALS:   (Developed with and agreed upon by patient and/or caregiver.)  Pt will be modified (I) with ADL's and ambulating short distances.     OCCUPATIONAL THERAPY Initial Assessment, Daily Note, Discharge, and PM       OT Visit Days: 1  Acknowledge Orders  Time  OT Charge Capture  Rehab Caseload Tracker      Melissa Molina is a 89 y.o. female   PRIMARY DIAGNOSIS: Hypoglycemia  Hypoglycemia [E16.2]  Hypothermia, initial encounter [T68.XXXA]  Altered mental status, unspecified altered mental status type [R41.82]       Reason for Referral: Generalized Muscle Weakness (M62.81)  Observation: Payor: MEDICARE / Plan: MEDICARE PART A AND B / Product Type: *No Product type* /     ASSESSMENT:     REHAB RECOMMENDATIONS:   Recommendation to date pending progress:  Setting:  No further skilled occupational therapy after discharge from hospital    Equipment:    None     ASSESSMENT:  Ms. Molina was admitted with above diagnosis. Pt seen in room and noted to alert, oriented and willing to work with therapy. Pt is anxious to get home as her  [facility] and the family is going to see him on Tuesday. Pt is noted up household distance and able to attend to her own self needs with a RW. Pt lives with family and despite the fact they work she is able to attend to herself when they are not available. No further OT needs.      [facility] AM-PAC™ “6 Clicks” Daily Activity Inpatient Short Form:                SUBJECTIVE:     Ms. Molina states, \"I need to get home\"     Social/Functional Lives With: Family  Type of Home: House  Bathroom Shower/Tub: Shower chair with back  Home Equipment: Walker - Rolling, Rollator    OBJECTIVE:     LINES / DRAINS / AIRWAY: None    RESTRICTIONS/PRECAUTIONS:       PAIN: VITALS / O2:   Pre Treatment:          Post Treatment: none       Vitals          Oxygen            GROSS EVALUATION: INTACT IMPAIRED   (See Comments)   UE AROM [x] []   UE PROM [x] [] 
ACUTE PHYSICAL THERAPY GOALS:   (Developed with and agreed upon by patient and/or caregiver.)      LTG:  (1.)Ms. Molina will move from supine to sit and sit to supine  in bed with INDEPENDENT within 5 treatment day(s).    (2.)Ms. Molina will transfer from bed to chair and chair to bed with MODIFIED INDEPENDENCE using the least restrictive device within 5 treatment day(s).    (3.)Ms. Molina will ambulate with MODIFIED INDEPENDENCE for 200 feet with the least restrictive device within 5 treatment day(s).  ________________________________________________________________________________________________      PHYSICAL THERAPY Initial Assessment  (Link to Caseload Tracking: PT Visit Days : 1  Acknowledge Orders  Time In/Out  PT Charge Capture  Rehab Caseload Tracker    Melissa Molina is a 89 y.o. female   PRIMARY DIAGNOSIS: Hypoglycemia  Hypoglycemia [E16.2]  Hypothermia, initial encounter [T68.XXXA]  Altered mental status, unspecified altered mental status type [R41.82]       Reason for Referral: Generalized Muscle Weakness (M62.81)  Observation: Payor: MEDICARE / Plan: MEDICARE PART A AND B / Product Type: *No Product type* /     ASSESSMENT:     REHAB RECOMMENDATIONS:   Recommendation to date pending progress:  Setting:  No further skilled physical therapy after discharge from hospital    Equipment:    None     ASSESSMENT:  Ms. Molina admitted with above diagnoses.  Patient presented to the ER with altered mental status and minimal responsiveness.  Patient is modified independent at base - uses a rollator primarily for mobility though she has a ROLLING WALKER as well.  Patient moving well at time of assessment (STANDBY ASSIST at most)  Can follow her while admitted to ensure continued mobility but she does not have any therapy needs at d/c.      [facility] AM-PAC™ “6 Clicks” Basic Mobility Inpatient Short Form  AM-PAC Basic Mobility - Inpatient   How much help is needed turning from your back to your 
TRANSFER - IN REPORT:    Verbal report received from ESA Sosa on Melissa Molina  being received from ER for routine progression of patient care      Report consisted of patient's Situation, Background, Assessment and   Recommendations(SBAR).     Information from the following report(s) Nurse Handoff Report, ED SBAR, and Adult Overview was reviewed with the receiving nurse.    Opportunity for questions and clarification was provided.      Assessment completed upon patient's arrival to unit and care assumed.    
Eosinophils Absolute 0.2 0.0 - 0.8 K/UL    Basophils Absolute 0.1 0.0 - 0.2 K/UL    Immature Granulocytes Absolute 0.1 0.0 - 0.5 K/UL   POCT Glucose    Collection Time: 12/22/24  6:54 AM   Result Value Ref Range    POC Glucose 125 (H) 65 - 100 mg/dL    Performed by: Zina    POCT Glucose    Collection Time: 12/22/24 11:30 AM   Result Value Ref Range    POC Glucose 240 (H) 65 - 100 mg/dL    Performed by: HILARY    POCT Glucose    Collection Time: 12/22/24  2:33 PM   Result Value Ref Range    POC Glucose 294 (H) 65 - 100 mg/dL    Performed by: Gregg        No results for input(s): \"COVID19\" in the last 72 hours.    Current Meds:  Current Facility-Administered Medications   Medication Dose Route Frequency    insulin lispro (HUMALOG,ADMELOG) injection vial 0-8 Units  0-8 Units SubCUTAneous 4x Daily AC & HS    insulin glargine (LANTUS) injection vial 48 Units  48 Units SubCUTAneous Nightly    insulin lispro (HUMALOG,ADMELOG) injection vial 18 Units  18 Units SubCUTAneous TID WC    sodium chloride flush 0.9 % injection 5-40 mL  5-40 mL IntraVENous 2 times per day    sodium chloride flush 0.9 % injection 5-40 mL  5-40 mL IntraVENous PRN    0.9 % sodium chloride infusion   IntraVENous PRN    potassium chloride (KLOR-CON M) extended release tablet 40 mEq  40 mEq Oral PRN    Or    potassium bicarb-citric acid (EFFER-K) effervescent tablet 40 mEq  40 mEq Oral PRN    Or    potassium chloride 10 mEq/100 mL IVPB (Peripheral Line)  10 mEq IntraVENous PRN    magnesium sulfate 2000 mg in 50 mL IVPB premix  2,000 mg IntraVENous PRN    enoxaparin (LOVENOX) injection 40 mg  40 mg SubCUTAneous Daily    ondansetron (ZOFRAN-ODT) disintegrating tablet 4 mg  4 mg Oral Q8H PRN    Or    ondansetron (ZOFRAN) injection 4 mg  4 mg IntraVENous Q6H PRN    polyethylene glycol (GLYCOLAX) packet 17 g  17 g Oral Daily PRN    acetaminophen (TYLENOL) tablet 650 mg  650 mg Oral Q6H PRN    Or    acetaminophen

## 2024-12-23 NOTE — DISCHARGE INSTRUCTIONS
Begin taking lisinopril 5 mg daily.  This has been sent to your pharmacy.  -Monitor your blood pressure every morning at the same time.  Write daily blood pressure on paper to take to primary care provider appointment  -If the top number on your blood pressure is less than 140 you do not need to take your lisinopril.  -If the top number of your blood pressure is greater than 140 please take your lisinopril as instructed.  -Follow-up with your PCP    Your diabetes medications have been changed:  -Continue taking your Lantus as prescribed  -Stop taking your short acting insulin  -Continue checking your glucose 3 times daily and take record to PCP on follow-up  -If you feel hypoglycemic, shaky, lightheaded, short of breath, dizzy, nauseous or any other symptoms please check your blood sugar.  If your blood sugar drops below 70, drink juice and eat a meal with carbohydrates and seek medical attention     Please note that none of your glaucoma medications/eyedrops have been changed.